# Patient Record
Sex: MALE | Race: WHITE | Employment: FULL TIME
[De-identification: names, ages, dates, MRNs, and addresses within clinical notes are randomized per-mention and may not be internally consistent; named-entity substitution may affect disease eponyms.]

---

## 2024-07-17 ENCOUNTER — TELEPHONE (OUTPATIENT)
Facility: CLINIC | Age: 34
End: 2024-07-17

## 2024-07-17 DIAGNOSIS — M62.838 MUSCLE SPASM: Primary | ICD-10-CM

## 2024-07-17 NOTE — TELEPHONE ENCOUNTER
Pharmacy: [facility] [place]   Patient hoping to get a few weeks worth of medication. Hoping to switch to a new medication in future which he will discuss with Lv.    methocarbamol (ROBAXIN) 750 MG tablet [[id number]]    Order Details  Dose, Route, Frequency: As Directed   Dispense Quantity: -- Refills: --          Sig: TAKE ONE TABLET BY MOUTH FOUR TIMES A DAY AS NEEDED

## 2024-07-18 RX ORDER — METHOCARBAMOL 750 MG/1
TABLET, FILM COATED ORAL
Status: CANCELLED | OUTPATIENT
Start: 2024-07-18

## 2024-07-18 RX ORDER — METHOCARBAMOL 750 MG/1
750 TABLET, FILM COATED ORAL 4 TIMES DAILY
Qty: 40 TABLET | Refills: 0 | Status: SHIPPED | OUTPATIENT
Start: 2024-07-18 | End: 2024-07-28

## 2024-07-18 NOTE — TELEPHONE ENCOUNTER
Spoke to patient and he stated he has been on this medication since 2021 and it was being filled by his Psychiatrist.    He is taking it for muscle pain and tightness.

## 2024-07-19 ENCOUNTER — OFFICE VISIT (OUTPATIENT)
Facility: CLINIC | Age: 34
End: 2024-07-19
Payer: COMMERCIAL

## 2024-07-19 VITALS
SYSTOLIC BLOOD PRESSURE: 116 MMHG | OXYGEN SATURATION: 99 % | HEART RATE: 66 BPM | HEIGHT: 74 IN | WEIGHT: 197.6 LBS | DIASTOLIC BLOOD PRESSURE: 78 MMHG | RESPIRATION RATE: 16 BRPM | BODY MASS INDEX: 25.36 KG/M2 | TEMPERATURE: 98.6 F

## 2024-07-19 DIAGNOSIS — F34.1 PERSISTENT DEPRESSIVE DISORDER: ICD-10-CM

## 2024-07-19 DIAGNOSIS — Z00.00 ROUTINE PHYSICAL EXAMINATION: Primary | ICD-10-CM

## 2024-07-19 DIAGNOSIS — F41.1 GAD (GENERALIZED ANXIETY DISORDER): ICD-10-CM

## 2024-07-19 DIAGNOSIS — R53.82 CHRONIC FATIGUE: ICD-10-CM

## 2024-07-19 DIAGNOSIS — M62.838 MUSCLE SPASM: ICD-10-CM

## 2024-07-19 DIAGNOSIS — M79.7 FIBROMYALGIA: ICD-10-CM

## 2024-07-19 PROCEDURE — 99395 PREV VISIT EST AGE 18-39: CPT | Performed by: NURSE PRACTITIONER

## 2024-07-19 RX ORDER — ZINC GLUCONATE 50 MG
50 TABLET ORAL DAILY
COMMUNITY

## 2024-07-19 RX ORDER — FAMOTIDINE 20 MG/1
20 TABLET, FILM COATED ORAL DAILY
COMMUNITY

## 2024-07-19 RX ORDER — CHOLECALCIFEROL (VITAMIN D3) 125 MCG
3000 CAPSULE ORAL DAILY
COMMUNITY

## 2024-07-19 RX ORDER — TIZANIDINE 4 MG/1
4 TABLET ORAL 3 TIMES DAILY PRN
COMMUNITY
Start: 2024-07-15

## 2024-07-19 NOTE — PROGRESS NOTES
Chief Complaint   Patient presents with    Annual Exam       SUBJECTIVE:    Jose Raul Alvarado is a 34 y.o. male who is here today for a routine physical exam as well as follow up appointment regarding current medical conditions including: Persistent depression, generalized anxiety, chronic fatigue, fibromyalgia, and muscle spasms.    The patient is currently being managed and monitored by psychiatry due to his depression and anxiety.  He remains on duloxetine 30 mg daily and tolerates this well.  He is currently taking Lunesta for sleep which has been helpful.  He states he had started working out at a gym over the past few months and was going several times a week.  Admittedly, he states that his muscle pains had actually started to improve somewhat.  Then, he states he came down with COVID in late June and has just now started feeling back to baseline.    He states he has been using the Robaxin for his muscle pain and spasms.  He states the medication has been somewhat helpful and effective overall.    He has an appointment scheduled with neurology due to his migraine headaches.  He expects to see them sometime next week.    He states he is trying to be mindful of his diet.      Current Outpatient Medications   Medication Sig Dispense Refill    famotidine (PEPCID) 20 MG tablet Take 1 tablet by mouth daily      lactase (LACTAID) 3000 units tablet Take 1 tablet by mouth daily      tiZANidine (ZANAFLEX) 4 MG tablet Take 1 tablet by mouth 3 times daily as needed      zinc gluconate 50 MG tablet Take 1 tablet by mouth daily      methocarbamol (ROBAXIN-750) 750 MG tablet Take 1 tablet by mouth 4 times daily for 10 days 40 tablet 0    DULoxetine (CYMBALTA) 30 MG extended release capsule Take 1 capsule by mouth daily 90 capsule 0    vitamin D (ERGOCALCIFEROL) 1.25 MG (84337 UT) CAPS capsule       eszopiclone (LUNESTA) 2 MG TABS Take 1 tablet by mouth daily.      gabapentin (NEURONTIN) 100 MG capsule Take 1 capsule

## 2024-07-19 NOTE — PROGRESS NOTES
Jose Raul Alvarado is a 34 y.o. male     Chief Complaint   Patient presents with    Annual Exam       /78 (Site: Left Upper Arm, Position: Sitting, Cuff Size: Medium Adult)   Pulse 66   Temp 98.6 °F (37 °C) (Oral)   Resp 16   Ht 1.88 m (6' 2\")   Wt 89.6 kg (197 lb 9.6 oz)   SpO2 99%   BMI 25.37 kg/m²     Health Maintenance Due   Topic Date Due    Hepatitis B vaccine (1 of 3 - 3-dose series) Never done    Varicella vaccine (1 of 2 - 2-dose childhood series) Never done    HIV screen  Never done    DTaP/Tdap/Td vaccine (1 - Tdap) Never done    COVID-19 Vaccine (4 - 2023-24 season) 09/01/2023         \"Have you been to the ER, urgent care clinic since your last visit?  Hospitalized since your last visit?\"    YES - When: approximately 3  weeks ago.  [facility]    “Have you seen or consulted any other health care providers outside of [facility] since your last visit?”    NO

## 2024-08-12 ENCOUNTER — TELEPHONE (OUTPATIENT)
Facility: CLINIC | Age: 34
End: 2024-08-12

## 2024-08-13 DIAGNOSIS — M62.830 BACK MUSCLE SPASM: Primary | ICD-10-CM

## 2024-08-13 RX ORDER — TIZANIDINE 4 MG/1
4 TABLET ORAL EVERY 8 HOURS PRN
Qty: 60 TABLET | Refills: 0 | Status: SHIPPED | OUTPATIENT
Start: 2024-08-13

## 2024-08-13 NOTE — TELEPHONE ENCOUNTER
Patient called in stating that at his most recent visit he discussed with Lv that he is currently prescribed Robaxin for his back pain and muscle stiffness however, he has noticed that is is not as as effective. He advised his psychiatrist of this issue and she recommended the option of him taking Tizanidine and called this in however, due to her specialty or the possibility of the incorrect diagnosis the patient's insurance will not cover it. He is hoping to have this medication managed and [facility] by [facility] in [place]. States he understands that he is not to take the Tizanidine and Robaxin together.      tiZANidine (ZANAFLEX) 4 MG tablet [[id number]]    Order Details  Dose: 4 mg Route: Oral Frequency: 3 TIMES DAILY PRN   Dispense Quantity: -- Refills: --          Sig: Take 1 tablet by mouth 3 times daily as needed

## 2024-08-13 NOTE — TELEPHONE ENCOUNTER
Patient called in stating that at his most recent visit he discussed with Lv that he is currently prescribed Robaxin for his back pain and muscle stiffness however, he has noticed that is is not as as effective. He advised his psychiatrist of this issue and she recommended the option of him taking Tizanidine and called this in however, due to her specialty or the possibility of the incorrect diagnosis the patient's insurance will not cover it. He is hoping to have this medication managed and called in by Lv to the [facility] [facility] in [place]. States he understands that he is not to take the Tizanidine and Robaxin together.     PCP: Lv Reed APRN - NP    Last appt: 7/19/2024  Future Appointments   Date Time Provider Department Center   1/24/2025  1:00 PM Lv Reed APRN - NP [facility] DEP       Requested Prescriptions     Pending Prescriptions Disp Refills    tiZANidine (ZANAFLEX) 4 MG tablet       Sig: Take 1 tablet by mouth 3 times daily as needed       Prior labs and Blood pressures:  BP Readings from Last 3 Encounters:   07/19/24 116/78   04/18/24 130/72   10/18/22 130/74     Lab Results   Component Value Date/Time     01/28/2022 04:47 PM    K 4.0 01/28/2022 04:47 PM     01/28/2022 04:47 PM    CO2 30 01/28/2022 04:47 PM    BUN 9 01/28/2022 04:47 PM    GFRAA >60 01/28/2022 04:47 PM     No results found for: \"HBA1C\", \"EVW0LSVS\"  Lab Results   Component Value Date/Time    CHOL 183 01/28/2022 04:47 PM    HDL 68 01/28/2022 04:47 PM     01/28/2022 04:47 PM    VLDL 14 01/28/2022 04:47 PM     No results found for: \"VITD3\"        Lab Results   Component Value Date/Time    TSH 0.60 01/28/2022 04:47 PM

## 2024-11-06 DIAGNOSIS — M62.830 BACK MUSCLE SPASM: ICD-10-CM

## 2024-11-07 NOTE — TELEPHONE ENCOUNTER
PCP: Lv Reed APRN - NP    Last appt: 7/19/2024    Future Appointments   Date Time Provider Department Center   1/24/2025  1:00 PM Lv Reed APRN - NP [facility] DEP       Requested Prescriptions     Pending Prescriptions Disp Refills    tiZANidine (ZANAFLEX) 4 MG tablet [Pharmacy Med Name: tiZANidine HCl 4 MG Oral Tablet] 60 tablet 0     Sig: TAKE 1 TABLET BY MOUTH EVERY 8 HOURS AS NEEDED FOR MUSCLE SPASM

## 2024-11-11 DIAGNOSIS — M62.830 BACK MUSCLE SPASM: ICD-10-CM

## 2024-11-13 DIAGNOSIS — M62.838 MUSCLE SPASM: ICD-10-CM

## 2024-11-13 RX ORDER — METHOCARBAMOL 750 MG/1
750 TABLET, FILM COATED ORAL 4 TIMES DAILY
Qty: 40 TABLET | Refills: 0 | Status: SHIPPED | OUTPATIENT
Start: 2024-11-13 | End: 2024-11-23

## 2024-11-13 NOTE — TELEPHONE ENCOUNTER
PCP: Lv Reed APRN - NP    Last appt: 7/19/2024    Future Appointments   Date Time Provider Department Center   1/24/2025  1:00 PM Lv Reed APRN - NP [facility] DEP       Requested Prescriptions     Pending Prescriptions Disp Refills    methocarbamol (ROBAXIN) 750 MG tablet [Pharmacy Med Name: Methocarbamol 750 MG Oral Tablet] 40 tablet 0     Sig: TAKE 1 TABLET BY MOUTH 4 TIMES DAILY FOR 10 DAYS

## 2024-11-15 ENCOUNTER — TELEPHONE (OUTPATIENT)
Facility: CLINIC | Age: 34
End: 2024-11-15

## 2024-11-15 NOTE — TELEPHONE ENCOUNTER
Called patient requesting more information on the medication request. Patient did not answer so I left a message for patient to call the office.

## 2024-11-15 NOTE — TELEPHONE ENCOUNTER
Patient is requesting a prescription for Propecia (finasteride). He states his dermatologist initially prescribed it but they want his pcp to prescribe it now. He was unsure of the dosage. He states it was on the lower end and he takes once a day. He would like it to [facility] to [facility] in [place](on file). He states he is unable to come in because he is two hours away.

## 2024-12-27 ENCOUNTER — TELEPHONE (OUTPATIENT)
Facility: CLINIC | Age: 34
End: 2024-12-27

## 2024-12-27 DIAGNOSIS — F51.04 PSYCHOPHYSIOLOGICAL INSOMNIA: Primary | ICD-10-CM

## 2024-12-27 DIAGNOSIS — J30.89 ENVIRONMENTAL AND SEASONAL ALLERGIES: ICD-10-CM

## 2024-12-27 RX ORDER — LORATADINE 10 MG/1
10 TABLET ORAL DAILY
Qty: 90 TABLET | Refills: 1 | Status: SHIPPED | OUTPATIENT
Start: 2024-12-27

## 2024-12-27 NOTE — TELEPHONE ENCOUNTER
Patient is calling and states that he is having a hard time sleeping and is also experiencing some financial issues and would like to see if some things can be sent in to the pharmacy for him as a prescription so his insurance will cover it.    He is requesting a script for Melatonin and also Claritin.  Please call patient with questions.

## 2025-01-22 ENCOUNTER — TELEPHONE (OUTPATIENT)
Facility: CLINIC | Age: 35
End: 2025-01-22

## 2025-01-22 NOTE — TELEPHONE ENCOUNTER
Patient advised that provider does not complete virtual visits. Patient would like to check with his provider to see if he is able to complete his lab work closer to home if possible, states he lives hours away and due to weather has had to reschedule his appointment. Requesting to see if lab orders can be sent somewhere locally so the provider will have his results at the time of his rescheduled visit.

## 2025-01-22 NOTE — TELEPHONE ENCOUNTER
----- Message from Yelena JHA sent at 1/21/2025  4:35 PM EST -----  Regarding: [facility] Appointment Request  [facility] Appointment Request    Patient needs appointment for [facility] Appointment Type: Existing Condition Follow Up.    Patient Requested Dates(s): Same day   Patient Requested Time: Same time   Provider Name: Derek LvАЛЕКСАНДР Dewey NP    Reason for Appointment Request: Established Patient - Available appointments did not meet patient need  Add info: The patient requested to have his appointment on January 24 to be change as a virtual one.   --------------------------------------------------------------------------------------------------------------------------    Relationship to Patient: Self     Call Back Information: OK to leave message on voicemail  Preferred Call Back Number: Phone [phone]

## 2025-02-07 ENCOUNTER — TELEPHONE (OUTPATIENT)
Facility: CLINIC | Age: 35
End: 2025-02-07

## 2025-04-10 ENCOUNTER — OFFICE VISIT (OUTPATIENT)
Facility: CLINIC | Age: 35
End: 2025-04-10
Payer: MEDICAID

## 2025-04-10 VITALS
OXYGEN SATURATION: 98 % | HEART RATE: 76 BPM | DIASTOLIC BLOOD PRESSURE: 82 MMHG | TEMPERATURE: 98.6 F | RESPIRATION RATE: 16 BRPM | WEIGHT: 198.2 LBS | SYSTOLIC BLOOD PRESSURE: 122 MMHG | BODY MASS INDEX: 25.44 KG/M2 | HEIGHT: 74 IN

## 2025-04-10 DIAGNOSIS — G43.909 MIGRAINE WITHOUT STATUS MIGRAINOSUS, NOT INTRACTABLE, UNSPECIFIED MIGRAINE TYPE: ICD-10-CM

## 2025-04-10 DIAGNOSIS — G89.29 CHRONIC BACK PAIN, UNSPECIFIED BACK LOCATION, UNSPECIFIED BACK PAIN LATERALITY: ICD-10-CM

## 2025-04-10 DIAGNOSIS — Z87.820 HISTORY OF CONCUSSION: ICD-10-CM

## 2025-04-10 DIAGNOSIS — F41.1 GAD (GENERALIZED ANXIETY DISORDER): ICD-10-CM

## 2025-04-10 DIAGNOSIS — R53.82 CHRONIC FATIGUE: Primary | ICD-10-CM

## 2025-04-10 DIAGNOSIS — F51.04 PSYCHOPHYSIOLOGICAL INSOMNIA: ICD-10-CM

## 2025-04-10 DIAGNOSIS — M79.7 FIBROMYALGIA: ICD-10-CM

## 2025-04-10 DIAGNOSIS — M54.9 CHRONIC BACK PAIN, UNSPECIFIED BACK LOCATION, UNSPECIFIED BACK PAIN LATERALITY: ICD-10-CM

## 2025-04-10 PROCEDURE — 99214 OFFICE O/P EST MOD 30 MIN: CPT | Performed by: NURSE PRACTITIONER

## 2025-04-10 RX ORDER — TRETINOIN 0.25 MG/G
CREAM TOPICAL
COMMUNITY
Start: 2024-12-03

## 2025-04-10 RX ORDER — FINASTERIDE 1 MG/1
TABLET, FILM COATED ORAL
COMMUNITY

## 2025-04-10 RX ORDER — FLUTICASONE PROPIONATE 50 MCG
SPRAY, SUSPENSION (ML) NASAL
COMMUNITY

## 2025-04-10 RX ORDER — CLINDAMYCIN PHOSPHATE AND BENZOYL PEROXIDE 10; 50 MG/G; MG/G
GEL TOPICAL
COMMUNITY
Start: 2024-12-03

## 2025-04-10 RX ORDER — ATOGEPANT 60 MG/1
1 TABLET ORAL
COMMUNITY

## 2025-04-10 RX ORDER — ZALEPLON 5 MG/1
CAPSULE ORAL
COMMUNITY

## 2025-04-10 RX ORDER — B2/MAGNESIUM CIT,OXID/FEVERFEW 200-180-50
1 TABLET ORAL NIGHTLY
COMMUNITY
Start: 2025-04-07

## 2025-04-10 RX ORDER — DULOXETIN HYDROCHLORIDE 20 MG/1
20 CAPSULE, DELAYED RELEASE ORAL DAILY
COMMUNITY
Start: 2025-04-08

## 2025-04-10 RX ORDER — METHOCARBAMOL 750 MG/1
TABLET, FILM COATED ORAL
COMMUNITY

## 2025-04-10 SDOH — ECONOMIC STABILITY: FOOD INSECURITY: WITHIN THE PAST 12 MONTHS, THE FOOD YOU BOUGHT JUST DIDN'T LAST AND YOU DIDN'T HAVE MONEY TO GET MORE.: NEVER TRUE

## 2025-04-10 SDOH — ECONOMIC STABILITY: FOOD INSECURITY: WITHIN THE PAST 12 MONTHS, YOU WORRIED THAT YOUR FOOD WOULD RUN OUT BEFORE YOU GOT MONEY TO BUY MORE.: NEVER TRUE

## 2025-04-10 ASSESSMENT — PATIENT HEALTH QUESTIONNAIRE - PHQ9
8. MOVING OR SPEAKING SO SLOWLY THAT OTHER PEOPLE COULD HAVE NOTICED. OR THE OPPOSITE, BEING SO FIGETY OR RESTLESS THAT YOU HAVE BEEN MOVING AROUND A LOT MORE THAN USUAL: NOT AT ALL
7. TROUBLE CONCENTRATING ON THINGS, SUCH AS READING THE NEWSPAPER OR WATCHING TELEVISION: NOT AT ALL
1. LITTLE INTEREST OR PLEASURE IN DOING THINGS: NOT AT ALL
5. POOR APPETITE OR OVEREATING: NOT AT ALL
2. FEELING DOWN, DEPRESSED OR HOPELESS: NOT AT ALL
4. FEELING TIRED OR HAVING LITTLE ENERGY: NOT AT ALL
SUM OF ALL RESPONSES TO PHQ QUESTIONS 1-9: 0
SUM OF ALL RESPONSES TO PHQ QUESTIONS 1-9: 0
9. THOUGHTS THAT YOU WOULD BE BETTER OFF DEAD, OR OF HURTING YOURSELF: NOT AT ALL
SUM OF ALL RESPONSES TO PHQ QUESTIONS 1-9: 0
10. IF YOU CHECKED OFF ANY PROBLEMS, HOW DIFFICULT HAVE THESE PROBLEMS MADE IT FOR YOU TO DO YOUR WORK, TAKE CARE OF THINGS AT HOME, OR GET ALONG WITH OTHER PEOPLE: NOT DIFFICULT AT ALL
SUM OF ALL RESPONSES TO PHQ QUESTIONS 1-9: 0
3. TROUBLE FALLING OR STAYING ASLEEP: NOT AT ALL
6. FEELING BAD ABOUT YOURSELF - OR THAT YOU ARE A FAILURE OR HAVE LET YOURSELF OR YOUR FAMILY DOWN: NOT AT ALL

## 2025-04-10 NOTE — PROGRESS NOTES
Chief Complaint   Patient presents with    6 Month Follow-Up       SUBJECTIVE:    Jose Raul Haley is a 34 y.o. male who is here today for a follow up appointment regarding current medical conditions including: Chronic fatigue, fibromyalgia, generalized anxiety, migraine headaches, insomnia, and chronic low back pain.  He states he has started to see another PCP who is closer to where he lives.  He comes for a departure visit today.    The patient has not been seen in my office since 07/19/2024.  Since that time, he has visited with multiple providers for issues with dermatology, behavioral health, neurology, infectious disease, orthopedics, sleep specialist, ENT, and cardiology.  He has recently established with a new PCP (Dr. Mary Tam) who has assumed his care and is more local to his home in [place], [place].  He states he wanted to update me on several of his current issues.    Firstly, he states he finally began using Cymbalta as I had previously recommended due to his chronic pain and depressive features.  He states he is currently using 20 mg daily, but has only used this for short period of time now.  So far, he feels that he is tolerating the medication and feels that \"it is helping somewhat.\"    He states that he is still unable to hold a job due to his chronic fatigue issues.  He states he cannot tolerate any physical activity or concentration for more than 1 to 2 hours, after which time he feels completely exhausted and unable to carry on further.  He feels his chances of being able to maintain employment are nil.  Therefore, he has decided to seek disability in the interim.    He states he has been seeing a neurologist regarding his migraine headache issues.  He has been started on Qulipta which he feels has helped immensely.  He states it has been relieving 90 to 95% of his migraine headaches in general and is very happy with the benefit he is receiving.    He continues to suffer from neck

## 2025-04-10 NOTE — PROGRESS NOTES
Jose Raul Haley is a 34 y.o. male     Chief Complaint   Patient presents with    6 Month Follow-Up       /82   Pulse 76   Temp 98.6 °F (37 °C) (Oral)   Resp 16   Ht 1.88 m (6' 2\")   Wt 89.9 kg (198 lb 3.2 oz)   SpO2 98%   BMI 25.45 kg/m²     Health Maintenance Due   Topic Date Due    Varicella vaccine (1 of 2 - 13+ 2-dose series) Never done    DTaP/Tdap/Td vaccine (1 - Tdap) Never done    Diabetes screen  Never done    COVID-19 Vaccine (4 - 2024-25 season) 09/01/2024    Depression Monitoring  04/18/2025         \"Have you been to the ER, urgent care clinic since your last visit?  Hospitalized since your last visit?\"    YES - When: approximately 5 months ago.  Where and Why: [facility] ER for concussion.    “Have you seen or consulted any other health care providers outside of [facility] since your last visit?”    NO

## 2025-04-20 DIAGNOSIS — M62.830 BACK MUSCLE SPASM: ICD-10-CM

## 2025-04-21 NOTE — TELEPHONE ENCOUNTER
PCP: Lv Reed, APRN - NP    Last appt: 4/10/2025    No future appointments.    Requested Prescriptions     Pending Prescriptions Disp Refills    tiZANidine (ZANAFLEX) 4 MG tablet [Pharmacy Med Name: tiZANidine HCl 4 MG Oral Tablet] 60 tablet 0     Sig: TAKE 1 TABLET BY MOUTH EVERY 8 HOURS AS NEEDED FOR MUSCLE SPASM

## 2025-05-07 DIAGNOSIS — M62.838 OTHER MUSCLE SPASM: ICD-10-CM

## 2025-05-08 RX ORDER — METHOCARBAMOL 750 MG/1
750 TABLET, FILM COATED ORAL 4 TIMES DAILY
Qty: 40 TABLET | Refills: 0 | Status: SHIPPED | OUTPATIENT
Start: 2025-05-08